# Patient Record
Sex: FEMALE | Race: WHITE
[De-identification: names, ages, dates, MRNs, and addresses within clinical notes are randomized per-mention and may not be internally consistent; named-entity substitution may affect disease eponyms.]

---

## 2021-05-05 ENCOUNTER — APPOINTMENT (OUTPATIENT)
Dept: VASCULAR SURGERY | Facility: CLINIC | Age: 76
End: 2021-05-05
Payer: MEDICARE

## 2021-05-05 DIAGNOSIS — I87.2 VENOUS INSUFFICIENCY (CHRONIC) (PERIPHERAL): ICD-10-CM

## 2021-05-05 DIAGNOSIS — Z87.2 PERSONAL HISTORY OF DISEASES OF THE SKIN AND SUBCUTANEOUS TISSUE: ICD-10-CM

## 2021-05-05 DIAGNOSIS — Z78.9 OTHER SPECIFIED HEALTH STATUS: ICD-10-CM

## 2021-05-05 DIAGNOSIS — I83.93 ASYMPTOMATIC VARICOSE VEINS OF BILATERAL LOWER EXTREMITIES: ICD-10-CM

## 2021-05-05 DIAGNOSIS — I78.1 NEVUS, NON-NEOPLASTIC: ICD-10-CM

## 2021-05-05 PROCEDURE — 99203 OFFICE O/P NEW LOW 30 MIN: CPT

## 2021-05-05 RX ORDER — CONJUGATED ESTROGENS AND MEDROXYPROGESTERONE ACETATE 0.625 (14)
KIT ORAL
Refills: 0 | Status: ACTIVE | COMMUNITY

## 2021-05-05 RX ORDER — CHOLECALCIFEROL (VITAMIN D3) 25 MCG
TABLET ORAL
Refills: 0 | Status: ACTIVE | COMMUNITY

## 2021-05-05 RX ORDER — MULTIVIT-MIN/IRON/FOLIC ACID/K 18-600-40
CAPSULE ORAL
Refills: 0 | Status: ACTIVE | COMMUNITY

## 2021-05-14 NOTE — ASSESSMENT
[Arterial/Venous Disease] : arterial/venous disease [Medication Management] : medication management [Foot care/Footwear] : foot care/footwear [FreeTextEntry1] : 75 y/o F with asymptomatic varicose and spider veins likely with underlying venous reflux. On exam, BLEs w/ bulging varicose veins from the thighs down to the calves. Numerous spider veins particularly over the ankles and feet. Nontender or no induration. Toes are warm to touch with adequate capillary refill.  Skin intact with no breakdown.\par \par Plan: \par -Recommended to complete BLE venous US today to rule out GSV reflux. However, deferred and states she is afraid of undergoing any studies in which any type of pressure over her legs or veins that would flare up any discomfort\par -Recommended patient continues using compression panty hoses\par -Moisturize legs daily to avoid any skin cracks which would be a potential for recurrent cellulitis or venous ulcers\par -Continue daily stretching and exercising\par -Patient may continue to follow up here PRN

## 2021-05-14 NOTE — PHYSICAL EXAM
[Respiratory Effort] : normal respiratory effort [Normal Rate and Rhythm] : normal rate and rhythm [2+] : left 2+ [Varicose Veins Of Lower Extremities] : bilaterally [Ankle Swelling On The Left] : moderate [Alert] : alert [Oriented to Person] : oriented to person [Oriented to Place] : oriented to place [Oriented to Time] : oriented to time [Calm] : calm [No Rash or Lesion] : No rash or lesion [JVD] : no jugular venous distention  [Ankle Swelling (On Exam)] : not present [] : not present [Abdomen Tenderness] : ~T ~M No abdominal tenderness [de-identified] : Well appearing, friendly [de-identified] : NC/AT  [de-identified] : Supple  [FreeTextEntry1] : BLE: bulging varicose veins from the thighs down to the calves. Numerous spider veins particularly over the ankles and feet. Toes are warm to touch with adequate capillary refill.  [de-identified] : FROM

## 2021-05-14 NOTE — ADDENDUM
[FreeTextEntry1] : This note was written by Gloria Barber on 05/05/2021 acting as scribe for Aarti Escudero M.D.\par \par I, Dr. Cesar Broussard  have read and attest that all the information, medical decision making and discharge instructions within are true and accurate. \par \par \par I, Dr. Cesar Broussard, personally performed the evaluation and management (E/M) services for this new patient.  That E/M includes conducting the initial examination, assessing all conditions, and establishing the plan of care.  Today, my ACP, Gaby Guzman NP, was here to observe my evaluation and management services for this patient to be followed going forward.

## 2021-05-14 NOTE — HISTORY OF PRESENT ILLNESS
[FreeTextEntry1] : 77 y/o F with no pertinent PMH, who presents to the office for initial evaluation of varicose veins. Patient referred by Dr.Margaret Figueroa. Patient reports having a long standing history of varicosities since she was ~ age 18. She states her veins became more prominent after her pregnancies many years ago. She states exercising, daily stretching and using compression pantyhose to control varicosities. She points to bulging varicose veins over both of her legs and reports spider veins over her ankles which have become more prominent over time as well. Otherwise, she reports no leg pain, leg sensory or motor deficits in the legs, or episodes of DVT/SVT. Patient mentions varicosities to be bothersome at the end of the day and reports elevation and NSAIDs have helped. Patient preferably would like to continue conservative measures for now.  Furthermore, patient mentions ~2 months ago, she had LLE cellulitis and was treated with Keflex during her stay in [place]. Denies any fever or chills, leg swelling, or ulcerations of her legs.

## 2021-05-14 NOTE — CONSULT LETTER
[Dear  ___] : Dear  [unfilled], [FreeTextEntry2] : Navya Figueroa MD\par [address]\[place] [address]  [FreeTextEntry1] : Thank you for referring Ms Cathy Perez for evaluation. She has a long standing history of varicose veins going back to her teenage years. She uses daily compression stockings (panty-hoses) and denies any previous vein procedures, vein thrombosis or venous ulcers. She had a fairly recent episode of cellulitis which resolved. \par \par On exam, she has large bulging varicose veins on both thighs down the to the calves, particularly over the left leg. Scattered spider veins on both ankles and feet. Skin moisturized with no ulcers.\par \par I wanted to obtain a venous duplex scan at the time in my office to assess for deep and superficial venous insufficiency but she refused because she claims the scans make her leg symptoms worse.  I then advised her to continue using compression stockings, moisturize her skin well and stay active. She may see us as needed.\par \par My complete EMR office note is below for your records.  [FreeTextEntry3] : Sincerely, \par \par Cesar Broussard M.D. \par , [facility]\par , Department of Surgery [facility]\par Professor of Surgery, [employer] at [facility]

## 2024-08-01 ENCOUNTER — APPOINTMENT (OUTPATIENT)
Dept: VASCULAR SURGERY | Facility: CLINIC | Age: 79
End: 2024-08-01
Payer: MEDICARE

## 2024-08-01 DIAGNOSIS — I83.892 VARICOSE VEINS OF LEFT LOWER EXTREMITY WITH OTHER COMPLICATIONS: ICD-10-CM

## 2024-08-01 PROCEDURE — 99203 OFFICE O/P NEW LOW 30 MIN: CPT

## 2024-08-02 ENCOUNTER — APPOINTMENT (OUTPATIENT)
Dept: VASCULAR SURGERY | Facility: CLINIC | Age: 79
End: 2024-08-02
Payer: MEDICARE

## 2024-08-02 PROCEDURE — 93971 EXTREMITY STUDY: CPT | Mod: LT

## 2024-08-04 NOTE — PHYSICAL EXAM
[Respiratory Effort] : normal respiratory effort [Normal Rate and Rhythm] : normal rate and rhythm [1+] : left 1+ [Varicose Veins Of Lower Extremities] : bilaterally [Ankle Swelling On The Left] : moderate [Alert] : alert [Oriented to Person] : oriented to person [Oriented to Place] : oriented to place [Oriented to Time] : oriented to time [Calm] : calm [] : not present [Ankle Swelling (On Exam)] : not present [FreeTextEntry1] : BLEs w/ bulging varicose veins from the thighs down to the calves, more prominent on LLE. Veins soft, non-tender, no erythema and not indurated. Numerous spider veins particularly over the ankles and feet. Toes are warm to touch with adequate capillary refill.  Skin intact with no breakdown. Ecchymosis under L medial knee varicose veins, resolving. [de-identified] : Well appearing, friendly [de-identified] : FROM [de-identified] : See cardiovascular section

## 2024-08-04 NOTE — HISTORY OF PRESENT ILLNESS
[FreeTextEntry1] : 78 y/o F with no pertinent PMH, who was originally referred by Dr.Margaret Figueroa and last seen 2021. She was seen at the time for asymptomatic vv and spider veins. Patient reported having a long standing history of varicosities since she was ~ age 18. She stated her veins became more prominent after her pregnancies many years ago. US was not completed at the time per pt request but was deemed likely to have underlying venous reflux/insufficiency. She wanted to continue with conservative measures.  Today, she presents with concerns of very large bulging veins on the left leg. She explains she cannot wait to see Dr Broussard on 8/14. The veins were not bulging in the past like they are now. She points also to bruising under the veins. Denies any trauma to the area, fever/chills, wounds, palpable cords, SOB/CP. No leg sensory or motor deficits in the legs, or episodes of DVT/SVT. She does reports the veins hurt her on the left leg but not on the right leg. She does reports started playing more pick-a-ball and might have moved or forced a movement while playing.    She is accompanied by daughter in law.

## 2024-08-04 NOTE — ASSESSMENT
[Arterial/Venous Disease] : arterial/venous disease [Medication Management] : medication management [Foot care/Footwear] : foot care/footwear [FreeTextEntry1] : 78 y/o F with RLE asymptomatic varicose, veins LLE symptomatic varicose veins and spider veins likely with underlying LLE venous reflux. Ecchymosis likely 2/2 trauma or musculoskeletal injury while playing pick-a-ball.  On exam, BLEs w/ bulging varicose veins from the thighs down to the calves, more prominent on LLE. Veins soft, non-tender, no erythema and not indurated. Numerous spider veins particularly over the ankles and feet. Toes are warm to touch with adequate capillary refill.  Skin intact with no breakdown. Ecchymosis under L medial knee varicose veins, resolving.  Plan:  -Recommended to complete BLE venous US to rule out GSV reflux. She is afraid of undergoing any studies in which any type of pressure over her legs or veins that would flare up any discomfort. Pt now in agreement and will come tomorrow to complete study. -Recommended patient to use compression stockings, new Rx provided 20-30mmHg thigh high. -Moisturize legs daily to avoid any skin cracks which would be a potential for recurrent cellulitis or venous ulcers -Continue staying active -Patient to f/u tomorrow for US and then with Dr Broussard per scheduled appt 8/14. She is likely a candidate for RFA and might need stab phlebectomy afterwards

## 2024-08-14 ENCOUNTER — APPOINTMENT (OUTPATIENT)
Dept: VASCULAR SURGERY | Facility: CLINIC | Age: 79
End: 2024-08-14

## 2024-08-14 VITALS
BODY MASS INDEX: 19.49 KG/M2 | SYSTOLIC BLOOD PRESSURE: 155 MMHG | HEART RATE: 82 BPM | DIASTOLIC BLOOD PRESSURE: 74 MMHG | HEIGHT: 63 IN | WEIGHT: 110 LBS

## 2024-08-14 DIAGNOSIS — I87.2 VENOUS INSUFFICIENCY (CHRONIC) (PERIPHERAL): ICD-10-CM

## 2024-08-14 DIAGNOSIS — I83.892 VARICOSE VEINS OF LEFT LOWER EXTREMITY WITH OTHER COMPLICATIONS: ICD-10-CM

## 2024-08-14 PROCEDURE — 99213 OFFICE O/P EST LOW 20 MIN: CPT

## 2024-08-14 NOTE — PHYSICAL EXAM
[Respiratory Effort] : normal respiratory effort [Normal Rate and Rhythm] : normal rate and rhythm [1+] : left 1+ [Varicose Veins Of Lower Extremities] : bilaterally [Ankle Swelling On The Left] : moderate [Alert] : alert [Oriented to Person] : oriented to person [Oriented to Place] : oriented to place [Oriented to Time] : oriented to time [Calm] : calm [Ankle Swelling (On Exam)] : not present [] : not present [de-identified] : Well appearing, friendly [FreeTextEntry1] : BLEs w/ bulging varicose veins from the thighs down to the calves, more prominent on LLE. Veins soft, non-tender, no erythema and not indurated. Numerous spider veins particularly over the ankles and feet. Toes are warm to touch with adequate capillary refill.  Skin intact with no breakdown.  Positive Trendelenburg's sign LLE. [de-identified] : FROM [de-identified] : See cardiovascular section

## 2024-08-14 NOTE — ASSESSMENT
[Arterial/Venous Disease] : arterial/venous disease [Medication Management] : medication management [Foot care/Footwear] : foot care/footwear [FreeTextEntry1] : 78 y/o F with RLE asymptomatic varicose, veins LLE symptomatic varicose veins and spider veins with LLE +GSV reflux (coursing superficially). On exam, BLEs w/ bulging varicose veins from the thighs down to the calves, more prominent on LLE. Veins soft, non-tender, no erythema and not indurated. Numerous spider veins particularly over the ankles and feet. Toes are warm to touch with adequate capillary refill.  Skin intact with no breakdown. Positive LLE Trendelenburg's.  Venous duplex from 8/1 reviewed neg DVT or SVT. +Reflux in GSV SFJ to calf vv. GSV courses superficially to skin. Findings discussed with pt and recommend LLE RFA in office.  Risks, complications and alternatives were discussed. All questions answered. Pt would like to proceed with surgery. She should continue to wear compression stockings, moisturize legs daily, and stay active. Pt encouraged to continue visits with podiatrist on a monthly basis. Pt to continue with aspirin. Will contact her to schedule the procedure.

## 2024-08-14 NOTE — PHYSICAL EXAM
[Respiratory Effort] : normal respiratory effort [Normal Rate and Rhythm] : normal rate and rhythm [1+] : left 1+ [Varicose Veins Of Lower Extremities] : bilaterally [Ankle Swelling On The Left] : moderate [Alert] : alert [Oriented to Person] : oriented to person [Oriented to Place] : oriented to place [Oriented to Time] : oriented to time [Calm] : calm [Ankle Swelling (On Exam)] : not present [] : not present [de-identified] : Well appearing, friendly [FreeTextEntry1] : BLEs w/ bulging varicose veins from the thighs down to the calves, more prominent on LLE. Veins soft, non-tender, no erythema and not indurated. Numerous spider veins particularly over the ankles and feet. Toes are warm to touch with adequate capillary refill.  Skin intact with no breakdown.  Positive Trendelenburg's sign LLE. [de-identified] : FROM [de-identified] : See cardiovascular section

## 2024-08-14 NOTE — ADDENDUM
[FreeTextEntry1] : I, Dr. Cesar Broussard, personally performed the evaluation and management (E/M) services for this established patient who presents today with (a) new problem(s)/exacerbation of (an) existing condition(s).  That E/M includes conducting the examination, assessing all new/exacerbated conditions, and establishing a new plan of care.  Today, my ACP, Gaby Guzman NP, was here to observe my evaluation and management services for this new problem/exacerbated condition to be followed going forward.

## 2024-08-14 NOTE — DATA REVIEWED
[FreeTextEntry1] : US Venous LLE 8/1/24 was neg DVT or SVT. +Reflux in GSV SFJ to calf vv. GSV courses superficially to skin. 
n/a

## 2024-08-14 NOTE — PHYSICAL EXAM
[Respiratory Effort] : normal respiratory effort [Normal Rate and Rhythm] : normal rate and rhythm [1+] : left 1+ [Varicose Veins Of Lower Extremities] : bilaterally [Ankle Swelling On The Left] : moderate [Alert] : alert [Oriented to Person] : oriented to person [Oriented to Place] : oriented to place [Oriented to Time] : oriented to time [Calm] : calm [Ankle Swelling (On Exam)] : not present [] : not present [de-identified] : Well appearing, friendly [FreeTextEntry1] : BLEs w/ bulging varicose veins from the thighs down to the calves, more prominent on LLE. Veins soft, non-tender, no erythema and not indurated. Numerous spider veins particularly over the ankles and feet. Toes are warm to touch with adequate capillary refill.  Skin intact with no breakdown.  Positive Trendelenburg's sign LLE. [de-identified] : FROM [de-identified] : See cardiovascular section

## 2024-08-14 NOTE — ASSESSMENT
[Arterial/Venous Disease] : arterial/venous disease [Medication Management] : medication management [Foot care/Footwear] : foot care/footwear [FreeTextEntry1] : 80 y/o F with RLE asymptomatic varicose, veins LLE symptomatic varicose veins and spider veins with LLE +GSV reflux (coursing superficially). On exam, BLEs w/ bulging varicose veins from the thighs down to the calves, more prominent on LLE. Veins soft, non-tender, no erythema and not indurated. Numerous spider veins particularly over the ankles and feet. Toes are warm to touch with adequate capillary refill.  Skin intact with no breakdown. Positive LLE Trendelenburg's.  Venous duplex from 8/1 reviewed neg DVT or SVT. +Reflux in GSV SFJ to calf vv. GSV courses superficially to skin. Findings discussed with pt and recommend LLE RFA in office.  Risks, complications and alternatives were discussed. All questions answered. Pt would like to proceed with surgery. She should continue to wear compression stockings, moisturize legs daily, and stay active. Pt encouraged to continue visits with podiatrist on a monthly basis. Pt to continue with aspirin. Will contact her to schedule the procedure.

## 2024-10-02 ENCOUNTER — APPOINTMENT (OUTPATIENT)
Dept: VASCULAR SURGERY | Facility: CLINIC | Age: 79
End: 2024-10-02
Payer: MEDICARE

## 2024-10-02 DIAGNOSIS — I83.892 VARICOSE VEINS OF LEFT LOWER EXTREMITY WITH OTHER COMPLICATIONS: ICD-10-CM

## 2024-10-02 DIAGNOSIS — I87.2 VENOUS INSUFFICIENCY (CHRONIC) (PERIPHERAL): ICD-10-CM

## 2024-10-02 DIAGNOSIS — Z87.2 PERSONAL HISTORY OF DISEASES OF THE SKIN AND SUBCUTANEOUS TISSUE: ICD-10-CM

## 2024-10-02 PROCEDURE — 36475 ENDOVENOUS RF 1ST VEIN: CPT | Mod: LT

## 2024-10-03 NOTE — PROCEDURE
[FreeTextEntry3] : Surgeon: Cesar Broussard MD  Assistant: Gaby Toney RVT  Pre-Op Diagnosis:  Incompetent LEFT Greater Saphenous Vein  Post-Op Diagnosis:  Same  Procedure:  Transcatheter Occlusion of LEFT Greater Saphenous Vein using a Radio- Frequency Thermal Ablation (VNUS) ClosureFAST Catheter.  Anesthesia: Local   History: Symptomatic varicose veins  Findings:  Complete occlusion of greater saphenous vein at end of procedure.  Procedure: The patients leg was prepped and draped.  Under local 1% Lidocaine the greater saphenous vein was punctured below the knee with a micropuncture needle and then the guidewire was inserted into the vein.  This was performed under ultrasound guidance.  The skin was incised with a #11 Blade, a dilator was inserted and then the 7 Fr. Introducer was placed into the greater saphenous vein.    The fossa ovalis was visualized with the Duplex scan.  The common femoral vein was confirmed to be patent.  Duplex examination of the greater saphenous vein from the calf to the groin was performed.  The greater saphenous and inferior epigastric vein were identified and the VNUS catheter was introduced through the introducer and into the vein up to the fossa ovalis.  It was positioned 3 cm distal to the inferior epigastric vein.  Tumescent solution (400 cc normal saline, 4cc of 8.4% Sodium bicarbonate and 40 cc 1% Lidoaine) was infiltrated subfascially over the vein.    The VNUS ClosureFAST catheter checked for proper function.  Thermal ablation was begun after the position of the catheter was once again confirmed to be 3 cm distal to the inferior epigastric branch of the greater saphenous vein. The proximal 7 cm was treated twice and then the rest of the vein was treated with single 20 sec cycles. The sheath and catheter were removed. Compression was applied for hemostasis.      Confirmation of common femoral vein patency and occlusion of the greater saphenous vein was made with duplex ultrasonography.    The leg was wrapped with gauze and Ace Bandages.  The patient remained on the table until alert and was then comfortable ambulating.             Patient will return in 2-3 days for repeat ultrasound to make sure GSV is closed and to check for heat induced thrombus.

## 2024-10-03 NOTE — ADDENDUM
[FreeTextEntry1] : This note was written by Gaby Guzman NP acting as scribe for Dr.Gary Aarti MASTERSON  I, Dr. Cesar Broussard  have read and attest that all the information, medical decision making and discharge instructions within are true and accurate.

## 2024-10-04 ENCOUNTER — APPOINTMENT (OUTPATIENT)
Dept: VASCULAR SURGERY | Facility: CLINIC | Age: 79
End: 2024-10-04
Payer: MEDICARE

## 2024-10-04 PROCEDURE — 93970 EXTREMITY STUDY: CPT

## 2024-11-06 ENCOUNTER — APPOINTMENT (OUTPATIENT)
Dept: VASCULAR SURGERY | Facility: CLINIC | Age: 79
End: 2024-11-06

## 2024-11-06 VITALS
BODY MASS INDEX: 19.49 KG/M2 | DIASTOLIC BLOOD PRESSURE: 78 MMHG | SYSTOLIC BLOOD PRESSURE: 127 MMHG | HEIGHT: 63 IN | WEIGHT: 110 LBS | HEART RATE: 84 BPM

## 2024-11-06 DIAGNOSIS — I87.2 VENOUS INSUFFICIENCY (CHRONIC) (PERIPHERAL): ICD-10-CM

## 2024-11-06 DIAGNOSIS — I78.1 NEVUS, NON-NEOPLASTIC: ICD-10-CM

## 2024-11-06 DIAGNOSIS — I83.93 ASYMPTOMATIC VARICOSE VEINS OF BILATERAL LOWER EXTREMITIES: ICD-10-CM

## 2024-11-06 PROCEDURE — 99213 OFFICE O/P EST LOW 20 MIN: CPT

## 2024-11-06 PROCEDURE — 93971 EXTREMITY STUDY: CPT | Mod: LT

## 2024-11-12 ENCOUNTER — RESULT REVIEW (OUTPATIENT)
Age: 79
End: 2024-11-12

## 2024-11-12 ENCOUNTER — OUTPATIENT (OUTPATIENT)
Dept: OUTPATIENT SERVICES | Facility: HOSPITAL | Age: 79
LOS: 1 days | End: 2024-11-12
Payer: MEDICARE

## 2024-11-12 DIAGNOSIS — E83.52 HYPERCALCEMIA: ICD-10-CM

## 2024-11-12 PROCEDURE — 93351 STRESS TTE COMPLETE: CPT

## 2024-11-12 PROCEDURE — 93351 STRESS TTE COMPLETE: CPT | Mod: 26,52

## 2024-12-17 ENCOUNTER — OUTPATIENT (OUTPATIENT)
Dept: OUTPATIENT SERVICES | Facility: HOSPITAL | Age: 79
LOS: 1 days | End: 2024-12-17

## 2024-12-17 ENCOUNTER — APPOINTMENT (OUTPATIENT)
Dept: ULTRASOUND IMAGING | Facility: HOSPITAL | Age: 79
End: 2024-12-17
Payer: MEDICARE

## 2024-12-17 ENCOUNTER — OUTPATIENT (OUTPATIENT)
Dept: OUTPATIENT SERVICES | Facility: HOSPITAL | Age: 79
LOS: 1 days | End: 2024-12-17
Payer: MEDICARE

## 2024-12-17 PROCEDURE — 73630 X-RAY EXAM OF FOOT: CPT | Mod: 26,50

## 2024-12-17 PROCEDURE — 73630 X-RAY EXAM OF FOOT: CPT

## 2024-12-17 PROCEDURE — 76882 US LMTD JT/FCL EVL NVASC XTR: CPT | Mod: 26,RT

## 2024-12-17 PROCEDURE — 76882 US LMTD JT/FCL EVL NVASC XTR: CPT

## 2025-02-24 ENCOUNTER — APPOINTMENT (OUTPATIENT)
Dept: VASCULAR SURGERY | Facility: CLINIC | Age: 80
End: 2025-02-24

## 2025-02-24 VITALS
SYSTOLIC BLOOD PRESSURE: 149 MMHG | HEIGHT: 63 IN | WEIGHT: 110 LBS | BODY MASS INDEX: 19.49 KG/M2 | DIASTOLIC BLOOD PRESSURE: 77 MMHG | HEART RATE: 98 BPM

## 2025-02-24 DIAGNOSIS — I87.2 VENOUS INSUFFICIENCY (CHRONIC) (PERIPHERAL): ICD-10-CM

## 2025-02-24 DIAGNOSIS — I83.891 VARICOSE VEINS OF RIGHT LOWER EXTREMITY WITH OTHER COMPLICATIONS: ICD-10-CM

## 2025-02-24 PROCEDURE — 99213 OFFICE O/P EST LOW 20 MIN: CPT

## 2025-03-06 PROBLEM — I83.891 SYMPTOMATIC VARICOSE VEINS OF RIGHT LOWER EXTREMITY: Status: ACTIVE | Noted: 2025-03-06

## 2025-03-06 PROBLEM — I87.2 CHRONIC VENOUS INSUFFICIENCY: Status: RESOLVED | Noted: 2021-05-05 | Resolved: 2025-03-06

## 2025-04-02 ENCOUNTER — APPOINTMENT (OUTPATIENT)
Dept: VASCULAR SURGERY | Facility: CLINIC | Age: 80
End: 2025-04-02
Payer: MEDICARE

## 2025-04-02 DIAGNOSIS — I87.2 VENOUS INSUFFICIENCY (CHRONIC) (PERIPHERAL): ICD-10-CM

## 2025-04-02 PROCEDURE — 36475 ENDOVENOUS RF 1ST VEIN: CPT | Mod: RT

## 2025-04-03 ENCOUNTER — APPOINTMENT (OUTPATIENT)
Dept: VASCULAR SURGERY | Facility: CLINIC | Age: 80
End: 2025-04-03
Payer: MEDICARE

## 2025-04-03 ENCOUNTER — NON-APPOINTMENT (OUTPATIENT)
Age: 80
End: 2025-04-03

## 2025-04-03 DIAGNOSIS — Z98.890 OTHER SPECIFIED POSTPROCEDURAL STATES: ICD-10-CM

## 2025-04-03 PROCEDURE — 99212 OFFICE O/P EST SF 10 MIN: CPT

## 2025-04-03 PROCEDURE — 93971 EXTREMITY STUDY: CPT | Mod: RT

## 2025-04-04 ENCOUNTER — APPOINTMENT (OUTPATIENT)
Dept: VASCULAR SURGERY | Facility: CLINIC | Age: 80
End: 2025-04-04
Payer: MEDICARE

## 2025-04-04 PROBLEM — Z98.890 STATUS POST ENDOVENOUS RADIOFREQUENCY ABLATION (RFA) OF SAPHENOUS VEIN: Status: ACTIVE | Noted: 2025-04-03

## 2025-04-04 PROCEDURE — 93971 EXTREMITY STUDY: CPT | Mod: RT

## 2025-05-07 ENCOUNTER — NON-APPOINTMENT (OUTPATIENT)
Age: 80
End: 2025-05-07

## 2025-05-07 ENCOUNTER — APPOINTMENT (OUTPATIENT)
Dept: VASCULAR SURGERY | Facility: CLINIC | Age: 80
End: 2025-05-07
Payer: MEDICARE

## 2025-05-07 VITALS
HEART RATE: 87 BPM | WEIGHT: 110 LBS | BODY MASS INDEX: 19.49 KG/M2 | DIASTOLIC BLOOD PRESSURE: 77 MMHG | HEIGHT: 63 IN | SYSTOLIC BLOOD PRESSURE: 136 MMHG

## 2025-05-07 DIAGNOSIS — I83.892 VARICOSE VEINS OF LEFT LOWER EXTREMITY WITH OTHER COMPLICATIONS: ICD-10-CM

## 2025-05-07 DIAGNOSIS — Z98.890 OTHER SPECIFIED POSTPROCEDURAL STATES: ICD-10-CM

## 2025-05-07 DIAGNOSIS — I83.891 VARICOSE VEINS OF RIGHT LOWER EXTREMITY WITH OTHER COMPLICATIONS: ICD-10-CM

## 2025-05-07 DIAGNOSIS — I87.2 VENOUS INSUFFICIENCY (CHRONIC) (PERIPHERAL): ICD-10-CM

## 2025-05-07 DIAGNOSIS — I78.1 NEVUS, NON-NEOPLASTIC: ICD-10-CM

## 2025-05-07 DIAGNOSIS — Z78.9 OTHER SPECIFIED HEALTH STATUS: ICD-10-CM

## 2025-05-07 PROCEDURE — 99213 OFFICE O/P EST LOW 20 MIN: CPT
